# Patient Record
Sex: FEMALE | Race: WHITE | Employment: FULL TIME | URBAN - METROPOLITAN AREA
[De-identification: names, ages, dates, MRNs, and addresses within clinical notes are randomized per-mention and may not be internally consistent; named-entity substitution may affect disease eponyms.]

---

## 2021-10-05 ENCOUNTER — TELEPHONE (OUTPATIENT)
Dept: ORTHOPEDICS | Facility: CLINIC | Age: 56
End: 2021-10-05

## 2021-10-05 NOTE — TELEPHONE ENCOUNTER
[facility] Call Center    Phone Message    May a detailed message be left on voicemail: yes     Reason for Call: Other: Pt is scheduled for 10/15 needs a sooner appt. Please call to schedule     Action Taken: Other: [facility]    Travel Screening: Not Applicable

## 2021-10-15 ENCOUNTER — OFFICE VISIT (OUTPATIENT)
Dept: ORTHOPEDICS | Facility: CLINIC | Age: 56
End: 2021-10-15
Payer: COMMERCIAL

## 2021-10-15 VITALS — HEIGHT: 61 IN | BODY MASS INDEX: 47.58 KG/M2 | WEIGHT: 252 LBS

## 2021-10-15 DIAGNOSIS — E66.01 MORBID OBESITY (H): ICD-10-CM

## 2021-10-15 DIAGNOSIS — M79.89 SOFT TISSUE MASS: Primary | ICD-10-CM

## 2021-10-15 PROCEDURE — 99204 OFFICE O/P NEW MOD 45 MIN: CPT | Performed by: ORTHOPAEDIC SURGERY

## 2021-10-15 RX ORDER — LORAZEPAM 0.5 MG/1
0.5 TABLET ORAL
Status: ON HOLD | COMMUNITY
Start: 2021-10-04 | End: 2022-02-07

## 2021-10-15 ASSESSMENT — MIFFLIN-ST. JEOR: SCORE: 1667.05

## 2021-10-15 NOTE — NURSING NOTE
"Reason For Visit:   Chief Complaint   Patient presents with     Consult     consulting left upper arm mass referred by Dr. Rivera // first noticed Spring of 2021 // had biopsy done 10/6 at [facility]        Ht 1.56 m (5' 1.42\")   Wt 114.3 kg (252 lb)   BMI 46.97 kg/m      Pain Assessment  Patient Currently in Pain: Denies (no pain per pt)    Katarina Willingham ATC    "

## 2021-10-15 NOTE — PROGRESS NOTES
This 56-year-old woman has had a growing mass in the left arm that was recently biopsied.  The biopsy results have not been reported back to her.  Despite that she reports that she had a PET scan that showed no other lesions.  She still has some discomfort from the biopsy that radiated few inches distally from the lesion.    On examination she is alert oriented has a normal mood and affect and is in no acute distress although expressed some anxiety certain portions of her interview.  She did not tolerate her MRI very well because it was hot and felt closed in.  She is anxious about coming into clinic and is very concerned about having general anesthesia.  In her left distal upper extremity there is no edema or erythema.  The patient does have a large protuberant mass over the anterior lateral deltoid with some purplish coloration on the skin and eschar in the middle.  It has not been draining per the patient's report however.  The hand is neurovascularly intact and has normal motion and strength.    I reviewed the patient's MRI which shows this large mass involving the skin and subcutaneous tissue down to the surface of the deltoid muscle.  It does seem to involve some of the deltoid fibers.  The bone is not involved.  This certainly could be a soft tissue sarcoma.    We talked about the possibility that this is a soft tissue sarcoma given its size and the impact is having on the skin.  If this is the case would most likely recommend radiation and then excision.  Lesion such as a rhabdomyosarcoma may be better served with chemotherapy upfront.  We will pursue the pathology slides so that we can confirm any diagnosis that surrounded by the outside institution.  I talked to the patient about all these treatments and the potential risks and benefits.  She is aware that she would lose patch of skin if this were a malignancy which would necessitate some soft tissue advancement and possibly a split-thickness skin graft.   Soft tissue sarcomas have the risk of lung metastases and occasionally lymph node metastases.  We will work on arranging radiation in your home if the presumed diagnosis of soft tissue sarcoma is confirmed.  Have answered all of her questions today.

## 2021-10-15 NOTE — NURSING NOTE
"RN spoke with Abbe and her spouse.  She states that she has \"white coat  Syndrome\"  Her CT and MRI and Biopsy and Mammograms and PET where all done at [facility] in [place].  We will send for pathology and have our staff here review it.  We will then call her and start Radiation therapy close to home.  She was instructed to call me when she was close to the end of her Radiation therapy.  We reviewed plan going forward and will certainly review again.  Imaging of CT not MRI will be done for restaging after Radiation and done locally and patient may have virutal to discuss surgery.    "

## 2021-10-15 NOTE — LETTER
10/15/2021    RE: Abbe Lezama  [address] [address]  [place] [address]    Dear Colleague,    Thank you for referring your patient, Abbe Lezama, to the [facility] [place]. Please see a copy of my visit note below.    This 56-year-old woman has had a growing mass in the left arm that was recently biopsied.  The biopsy results have not been reported back to her.  Despite that she reports that she had a PET scan that showed no other lesions.  She still has some discomfort from the biopsy that radiated few inches distally from the lesion.    On examination she is alert oriented has a normal mood and affect and is in no acute distress although expressed some anxiety certain portions of her interview.  She did not tolerate her MRI very well because it was hot and felt closed in.  She is anxious about coming into clinic and is very concerned about having general anesthesia.  In her left distal upper extremity there is no edema or erythema.  The patient does have a large protuberant mass over the anterior lateral deltoid with some purplish coloration on the skin and eschar in the middle.  It has not been draining per the patient's report however.  The hand is neurovascularly intact and has normal motion and strength.    I reviewed the patient's MRI which shows this large mass involving the skin and subcutaneous tissue down to the surface of the deltoid muscle.  It does seem to involve some of the deltoid fibers.  The bone is not involved.  This certainly could be a soft tissue sarcoma.    We talked about the possibility that this is a soft tissue sarcoma given its size and the impact is having on the skin.  If this is the case would most likely recommend radiation and then excision.  Lesion such as a rhabdomyosarcoma may be better served with chemotherapy upfront.  We will pursue the pathology slides so that we can confirm any diagnosis that surrounded by the outside institution.  I  talked to the patient about all these treatments and the potential risks and benefits.  She is aware that she would lose patch of skin if this were a malignancy which would necessitate some soft tissue advancement and possibly a split-thickness skin graft.  Soft tissue sarcomas have the risk of lung metastases and occasionally lymph node metastases.  We will work on arranging radiation in your home if the presumed diagnosis of soft tissue sarcoma is confirmed.  Have answered all of her questions today.      Gamal Zhang MD

## 2021-10-18 ENCOUNTER — TELEPHONE (OUTPATIENT)
Dept: ORTHOPEDICS | Facility: CLINIC | Age: 56
End: 2021-10-18

## 2021-10-20 ENCOUNTER — TELEPHONE (OUTPATIENT)
Dept: ORTHOPEDICS | Facility: CLINIC | Age: 56
End: 2021-10-20

## 2021-10-20 NOTE — TELEPHONE ENCOUNTER
RN called over toPathology at:    [facility]    Address: [address], [place] [address]    Phone: [phone]    Rn left a voice message in Pathology asking if they received our request for slides faxed on  10-18-21.    Fax: [phone]      Please call me back if yes or no.  Thanks  Yocasta

## 2021-10-20 NOTE — TELEPHONE ENCOUNTER
RN called and spoke with Abbe.  She was informed to go thru her Oncologist for the Radiation referral locally.  They would know who does Radiation down there more than I do.  She will ask her Oncologist.  RN stressed to her that we needed to confirm diagnosis with our Pathology department before she is seen.  She should not make an appt for this week.  RN will call when we have reviewed pathology.

## 2021-10-20 NOTE — TELEPHONE ENCOUNTER
RN called down to:      [facility]. [facility] is part of the [facility].   COVID-19 info: [email or web address]  Address: [address] [place] [id number]  Phone: [phone]    They do not have a Radiation department.

## 2021-10-20 NOTE — TELEPHONE ENCOUNTER
[facility]    Phone Message    May a detailed message be left on voicemail: yes     Reason for Call: Requesting Results   Name/type of test: Biopsy   Date of test: 10/6  Was test done at a location other than [facility]?: No      Action Taken: Message routed to:  [facility]o    Travel Screening: Not Applicable     Patient took Biopsy @ [facility] and says they have been trying to PUSH results through PACS but aren't having any luck , pt's  Says they may have to fax results ( fax # was provided incase PACS wont work )     Patient needs a call back as soon as we have results so she can hopefully start radiation next week -- she would like us to assist in getting results to us

## 2021-10-20 NOTE — TELEPHONE ENCOUNTER
RN called over to Pathology here at the  and spoke with Everette.  He confirmed that we have not yet received pathology slides from [facility] yet.

## 2021-10-20 NOTE — TELEPHONE ENCOUNTER
RN returned call to Abbe.  The confusion is that we are requesting pathology slides to be sent to us for review by our pathology department.  This is not done thru PACS. On ly imaging is done this route.  RN has requested imaging from [facility] to be sent to our Pathology department.  She is asking to get scheduled for Radiation while waiting for confirmation of this.  She wants to have the Radiation done in [place]. Not here at the .  She does not know where Radiation or who does it down there.  RN will call over to [facility] and ask.

## 2021-10-21 ENCOUNTER — TELEPHONE (OUTPATIENT)
Dept: ORTHOPEDICS | Facility: CLINIC | Age: 56
End: 2021-10-21

## 2021-10-21 NOTE — TELEPHONE ENCOUNTER
[facility] Call Center    Phone Message    May a detailed message be left on voicemail: yes     Reason for Call: Other: Patient had requested that Octavia call on her behalf to see if the slides have been received so that a referral could be sent to [facility].  let Octavia know that a call was put to [facility] yesterday and we are still waiting on response. Octavia has offered her assistance with this patient if there is anything she can do to help Please call her at [phone] if needed.      Action Taken: Message routed to:  [facility]o    Travel Screening: Not Applicable

## 2021-10-22 ENCOUNTER — TELEPHONE (OUTPATIENT)
Dept: ORTHOPEDICS | Facility: CLINIC | Age: 56
End: 2021-10-22

## 2021-10-22 ENCOUNTER — LAB (OUTPATIENT)
Dept: LAB | Facility: CLINIC | Age: 56
End: 2021-10-22
Payer: COMMERCIAL

## 2021-10-22 PROCEDURE — 88342 IMHCHEM/IMCYTCHM 1ST ANTB: CPT | Mod: TC | Performed by: ORTHOPAEDIC SURGERY

## 2021-10-22 NOTE — TELEPHONE ENCOUNTER
RN calling to see if Pathology has been sent to us for review.    [facility]   [address], [place] [address]  Hours:   Open 24 hours     More hours  Phone: [phone]

## 2021-10-25 NOTE — TELEPHONE ENCOUNTER
Rn reached back out to Octavia, who is a navigator at the [facility]..  [place] patient.  RN informed her that the slides did get to us and they are currently in process and final diagnosis is not final.  When it has been finalized she will need Radiation.  Octavia says that she will go to [facility], [facility] of [place]  Providers:  Girish Morgan and Beulah Barraza, Radiation  Fax:[phone]  Phone [phone]    RN provided Octavia her direct number and she offered to push imaging to [place] if needed.

## 2021-10-28 LAB
PATH REPORT.COMMENTS IMP SPEC: ABNORMAL
PATH REPORT.COMMENTS IMP SPEC: YES
PATH REPORT.FINAL DX SPEC: ABNORMAL
PATH REPORT.GROSS SPEC: ABNORMAL
PATH REPORT.RELEVANT HX SPEC: ABNORMAL
PATH REPORT.RELEVANT HX SPEC: ABNORMAL
PATH REPORT.SITE OF ORIGIN SPEC: ABNORMAL

## 2021-10-28 PROCEDURE — 88325 CONSLTJ COMPRE RVW REC REPRT: CPT | Performed by: PATHOLOGY

## 2021-11-03 DIAGNOSIS — C49.9 SARCOMA (H): Primary | ICD-10-CM

## 2021-11-03 NOTE — TELEPHONE ENCOUNTER
[facility] Call Center    Phone Message    May a detailed message be left on voicemail: yes     Reason for Call: Other: Octavia is calling to get an update from Yocasta      Action Taken: Message routed to:  [facility]o    Travel Screening: Not Applicable     They would like to know if the Pathology report has been Re read and if we have a final diagnosis so that the patient can begin treatments in [place] -- Patient Is very anxious and would like to get an update as soon as possible       Octavia would like a call back from Yocasta [facility] to confirm this so she can get into connection / [place] to get the ball rolling

## 2021-11-04 ENCOUNTER — TELEPHONE (OUTPATIENT)
Dept: ORTHOPEDICS | Facility: CLINIC | Age: 56
End: 2021-11-04

## 2021-11-04 NOTE — TELEPHONE ENCOUNTER
RN called and left a voice message for Octavia.  RN faxed over referral and called and verified they have them.

## 2021-11-29 ENCOUNTER — TRANSFERRED RECORDS (OUTPATIENT)
Dept: HEALTH INFORMATION MANAGEMENT | Facility: CLINIC | Age: 56
End: 2021-11-29
Payer: COMMERCIAL

## 2021-12-17 ENCOUNTER — TELEPHONE (OUTPATIENT)
Dept: ORTHOPEDICS | Facility: CLINIC | Age: 56
End: 2021-12-17
Payer: COMMERCIAL

## 2021-12-17 NOTE — TELEPHONE ENCOUNTER
[facility] Call [facility]    Phone Message    May a detailed message be left on voicemail: yes     Reason for Call: Other: Patient is calling to speak with Yocasta, patient did not state reason. Please call patient back.  left 239p     Action Taken: Message routed to:  [facility] Ortho    Travel Screening: Not Applicable

## 2021-12-21 ENCOUNTER — TELEPHONE (OUTPATIENT)
Dept: ORTHOPEDICS | Facility: CLINIC | Age: 56
End: 2021-12-21
Payer: COMMERCIAL

## 2021-12-21 NOTE — TELEPHONE ENCOUNTER
RN called and spoke with Abbe.  Her last day of Radiation is 12-28-21.  RN will call her back with the plan of care going forward.

## 2022-01-10 ENCOUNTER — TELEPHONE (OUTPATIENT)
Dept: ORTHOPEDICS | Facility: CLINIC | Age: 57
End: 2022-01-10
Payer: COMMERCIAL

## 2022-01-10 DIAGNOSIS — C49.9 SARCOMA (H): Primary | ICD-10-CM

## 2022-01-10 NOTE — TELEPHONE ENCOUNTER
[facility]    Phone Message    May a detailed message be left on voicemail: yes     Reason for Call: Other: Per Octavia, patient was thinking that she also needed a CT of her Left Arm. Please fax additional CT order if needed to [phone]     Action Taken: Message routed to:  [facility] Ortho    Travel Screening: Not Applicable

## 2022-01-12 DIAGNOSIS — M79.89 SOFT TISSUE MASS: Primary | ICD-10-CM

## 2022-01-12 DIAGNOSIS — C49.9 SARCOMA (H): ICD-10-CM

## 2022-01-21 ENCOUNTER — TELEPHONE (OUTPATIENT)
Dept: ORTHOPEDICS | Facility: CLINIC | Age: 57
End: 2022-01-21
Payer: COMMERCIAL

## 2022-01-21 NOTE — TELEPHONE ENCOUNTER
RN called and spoke with Abbe regarding imaging and follow up appt with Dr. Zhang and questions about surgery and recovery

## 2022-01-24 ENCOUNTER — TELEPHONE (OUTPATIENT)
Dept: ORTHOPEDICS | Facility: CLINIC | Age: 57
End: 2022-01-24
Payer: COMMERCIAL

## 2022-01-24 NOTE — TELEPHONE ENCOUNTER
RN returned call to Abbe.  RN has the reports and imaging has been requested.  RN informed Gwendolyn that Abbe is aware that he will not look at imaging before her appt on Wednesday with us as I told her this last week.     She states that the patient is very anxious.      Fax:[phone].

## 2022-01-24 NOTE — TELEPHONE ENCOUNTER
[facility] Call [facility]    Phone Message    May a detailed message be left on voicemail: yes     Reason for Call: Other: Octavia Ruelas, Oncology Nurse Navigator at [facility], calling to speak with Yocasta. Please call back to [phone] to discuss patientAbbe.      Action Taken: Message routed to:  [facility] Ortho    Travel Screening: Not Applicable

## 2022-01-26 ENCOUNTER — VIRTUAL VISIT (OUTPATIENT)
Dept: ORTHOPEDICS | Facility: CLINIC | Age: 57
End: 2022-01-26
Payer: COMMERCIAL

## 2022-01-26 DIAGNOSIS — C49.9 SARCOMA (H): Primary | ICD-10-CM

## 2022-01-26 PROCEDURE — 99213 OFFICE O/P EST LOW 20 MIN: CPT | Mod: TEL | Performed by: ORTHOPAEDIC SURGERY

## 2022-01-26 NOTE — PROGRESS NOTES
Abbe is a 56 year old who is being evaluated via a billable telephone visit.      What phone number would you like to be contacted at? [phone]  How would you like to obtain your AVS? Adonis    Phone call duration: 26 minutes    I had an extensive talk with this patient about her tumor and upcoming surgery.  She is anxious about surgery.  She has been managing open wound over the tumor that has begun to drain a little more.  She finished radiation about a month ago.  She has noticed some fullness over the left clavicle over the last couple weeks.    I reviewed her imaging studies and the reports.  She has a subcutaneous mass superficial to the left clavicle which is new.  Her lung fields do not show any obvious tumor.    Over the phone she was alert and oriented.  She has made good questions.  She expressed anxiety about the upcoming surgery.    We discussed that she would have wide resection and an expected split-thickness skin graft from the left thigh to the left shoulder area.  She will need to have her initial dressing change 4 days postoperatively after which she will have an Adaptic dressing for the next 10 days until her staples are removed.  For the first 4 days after surgery she will be in a sling with no shoulder motion but after that we will allow her to advance her activity as tolerated.  We will also remove this subcutaneous mass over the clavicle.  I have answered all her questions today.  Surgery is planned for 12 days from now.

## 2022-01-26 NOTE — NURSING NOTE
Reason For Visit:   Chief Complaint   Patient presents with     RECHECK     review imaging // discuss treatment plan        There were no vitals taken for this visit.    Pain Assessment  Patient Currently in Pain: Denies (no pain per pt)    Katarina Willingham ATC

## 2022-01-26 NOTE — LETTER
1/26/2022         RE: Abbe Lezama  [address] [address]  [place] [address]        Dear Colleague,    Thank you for referring your patient, Abbe Lezama, to the [facility] [place]. Please see a copy of my visit note below.    Abbe is a 56 year old who is being evaluated via a billable telephone visit.      What phone number would you like to be contacted at? [phone]  How would you like to obtain your AVS? Daisyhart    Phone call duration: 26 minutes    I had an extensive talk with this patient about her tumor and upcoming surgery.  She is anxious about surgery.  She has been managing open wound over the tumor that has begun to drain a little more.  She finished radiation about a month ago.  She has noticed some fullness over the left clavicle over the last couple weeks.    I reviewed her imaging studies and the reports.  She has a subcutaneous mass superficial to the left clavicle which is new.  Her lung fields do not show any obvious tumor.    Over the phone she was alert and oriented.  She has made good questions.  She expressed anxiety about the upcoming surgery.    We discussed that she would have wide resection and an expected split-thickness skin graft from the left thigh to the left shoulder area.  She will need to have her initial dressing change 4 days postoperatively after which she will have an Adaptic dressing for the next 10 days until her staples are removed.  For the first 4 days after surgery she will be in a sling with no shoulder motion but after that we will allow her to advance her activity as tolerated.  We will also remove this subcutaneous mass over the clavicle.  I have answered all her questions today.  Surgery is planned for 12 days from now.        Again, thank you for allowing me to participate in the care of your patient.        Sincerely,        Gamal Zhang MD

## 2022-01-31 ENCOUNTER — TELEPHONE (OUTPATIENT)
Dept: ORTHOPEDICS | Facility: CLINIC | Age: 57
End: 2022-01-31
Payer: COMMERCIAL

## 2022-01-31 NOTE — TELEPHONE ENCOUNTER
RN returned call to Abbe.  RN spoke with her and answered all of the questions that she has.  RN also encouraged some relaxation/ anxiety reducing techniques

## 2022-02-01 ENCOUNTER — TELEPHONE (OUTPATIENT)
Dept: ORTHOPEDICS | Facility: CLINIC | Age: 57
End: 2022-02-01
Payer: COMMERCIAL

## 2022-02-01 DIAGNOSIS — Z11.59 ENCOUNTER FOR SCREENING FOR OTHER VIRAL DISEASES: Primary | ICD-10-CM

## 2022-02-01 NOTE — TELEPHONE ENCOUNTER
Patient is scheduled for surgery with Dr. Zhang    Spoke with: GINGER Rust - scheduled with patient    Date of Surgery: 2/7/2022    Location: [place]    Informed patient they will need an adult  yes    Pre op with Provider n/a    H&P: Scheduled with pcp    Pre-procedure COVID-19 Test: patient will do that locally    Additional imaging/appointments: n/a    Surgery packet: Given in clinic     Additional comments: n/a

## 2022-02-02 ENCOUNTER — TRANSFERRED RECORDS (OUTPATIENT)
Dept: MULTI SPECIALTY CLINIC | Facility: CLINIC | Age: 57
End: 2022-02-02
Payer: COMMERCIAL

## 2022-02-02 LAB
ALT SERPL-CCNC: 13 IU/L (ref 8–45)
AST SERPL-CCNC: 13 IU/L (ref 2–40)
CREATININE (EXTERNAL): 0.73 MG/DL (ref 0.57–1.11)
GFR ESTIMATED (EXTERNAL): >60 ML/MIN/1.73M2
GFR ESTIMATED (IF AFRICAN AMERICAN) (EXTERNAL): >60 ML/MIN/1.73M2
GLUCOSE (EXTERNAL): 89 MG/DL (ref 65–100)
POTASSIUM (EXTERNAL): 3.8 MMOL/L (ref 3.5–5)

## 2022-02-02 RX ORDER — TRAMADOL HYDROCHLORIDE 50 MG/1
TABLET ORAL
Status: ON HOLD | COMMUNITY
Start: 2021-12-21 | End: 2022-02-07

## 2022-02-02 RX ORDER — SILVER SULFADIAZINE 10 MG/G
CREAM TOPICAL
Status: ON HOLD | COMMUNITY
Start: 2021-12-06 | End: 2022-02-07

## 2022-02-02 RX ORDER — ASCORBIC ACID 500 MG
500 TABLET ORAL
Status: ON HOLD | COMMUNITY
End: 2022-02-07

## 2022-02-02 RX ORDER — ACETAMINOPHEN AND CODEINE PHOSPHATE 300; 30 MG/1; MG/1
1 TABLET ORAL
Status: ON HOLD | COMMUNITY
Start: 2021-12-22 | End: 2022-02-07

## 2022-02-03 ENCOUNTER — TELEPHONE (OUTPATIENT)
Dept: ORTHOPEDICS | Facility: CLINIC | Age: 57
End: 2022-02-03
Payer: COMMERCIAL

## 2022-02-03 NOTE — TELEPHONE ENCOUNTER
[facility] Call Center    Phone Message    May a detailed message be left on voicemail: yes     Reason for Call Octavia stated Patient having Surgery with Doctor Leatha. Patient wants There Clinic ( [facility] ) to change Her Dressing after Surgery. Octavia stated No . They won't be doing that for Patient. Action Taken: Message routed to:  [facility]    Travel Screening: Not Applicable

## 2022-02-03 NOTE — TELEPHONE ENCOUNTER
Spoke with Octavia the nurse coordinator.  The local surgeons are not able to do the dressing changes.  She did give me information on the [facility] wound care clinic - [phone] and the [facility] [phone].

## 2022-02-06 ENCOUNTER — HEALTH MAINTENANCE LETTER (OUTPATIENT)
Age: 57
End: 2022-02-06

## 2022-02-07 ENCOUNTER — TELEPHONE (OUTPATIENT)
Dept: ORTHOPEDICS | Facility: CLINIC | Age: 57
End: 2022-02-07

## 2022-02-07 ENCOUNTER — HOSPITAL ENCOUNTER (OUTPATIENT)
Facility: CLINIC | Age: 57
Discharge: HOME | End: 2022-02-07
Attending: ORTHOPAEDIC SURGERY | Admitting: ORTHOPAEDIC SURGERY
Payer: COMMERCIAL

## 2022-02-07 ENCOUNTER — ANESTHESIA (OUTPATIENT)
Dept: SURGERY | Facility: CLINIC | Age: 57
End: 2022-02-07
Payer: COMMERCIAL

## 2022-02-07 ENCOUNTER — ANESTHESIA EVENT (OUTPATIENT)
Dept: SURGERY | Facility: CLINIC | Age: 57
End: 2022-02-07
Payer: COMMERCIAL

## 2022-02-07 VITALS
OXYGEN SATURATION: 97 % | WEIGHT: 222.66 LBS | HEART RATE: 104 BPM | SYSTOLIC BLOOD PRESSURE: 129 MMHG | HEIGHT: 61 IN | BODY MASS INDEX: 42.04 KG/M2 | DIASTOLIC BLOOD PRESSURE: 80 MMHG | RESPIRATION RATE: 20 BRPM | TEMPERATURE: 97.7 F

## 2022-02-07 DIAGNOSIS — C49.9 SARCOMA (H): Primary | ICD-10-CM

## 2022-02-07 LAB — GLUCOSE BLDC GLUCOMTR-MCNC: 75 MG/DL (ref 70–99)

## 2022-02-07 PROCEDURE — 710N000012 HC RECOVERY PHASE 2, PER MINUTE: Performed by: ORTHOPAEDIC SURGERY

## 2022-02-07 PROCEDURE — 88309 TISSUE EXAM BY PATHOLOGIST: CPT | Mod: TC | Performed by: ORTHOPAEDIC SURGERY

## 2022-02-07 PROCEDURE — 370N000017 HC ANESTHESIA TECHNICAL FEE, PER MIN: Performed by: ORTHOPAEDIC SURGERY

## 2022-02-07 PROCEDURE — 250N000013 HC RX MED GY IP 250 OP 250 PS 637: Performed by: STUDENT IN AN ORGANIZED HEALTH CARE EDUCATION/TRAINING PROGRAM

## 2022-02-07 PROCEDURE — 250N000011 HC RX IP 250 OP 636: Performed by: ANESTHESIOLOGY

## 2022-02-07 PROCEDURE — 250N000025 HC SEVOFLURANE, PER MIN: Performed by: ORTHOPAEDIC SURGERY

## 2022-02-07 PROCEDURE — C9290 INJ, BUPIVACAINE LIPOSOME: HCPCS | Performed by: ANESTHESIOLOGY

## 2022-02-07 PROCEDURE — 250N000009 HC RX 250: Performed by: ANESTHESIOLOGY

## 2022-02-07 PROCEDURE — 710N000010 HC RECOVERY PHASE 1, LEVEL 2, PER MIN: Performed by: ORTHOPAEDIC SURGERY

## 2022-02-07 PROCEDURE — 360N000077 HC SURGERY LEVEL 4, PER MIN: Performed by: ORTHOPAEDIC SURGERY

## 2022-02-07 PROCEDURE — 82962 GLUCOSE BLOOD TEST: CPT

## 2022-02-07 PROCEDURE — 250N000009 HC RX 250: Performed by: ORTHOPAEDIC SURGERY

## 2022-02-07 PROCEDURE — 999N000141 HC STATISTIC PRE-PROCEDURE NURSING ASSESSMENT: Performed by: ORTHOPAEDIC SURGERY

## 2022-02-07 PROCEDURE — 23078 RAD RESCJ TUM SHOULDER 5CM/>: CPT | Mod: LT | Performed by: ORTHOPAEDIC SURGERY

## 2022-02-07 PROCEDURE — 23077 RAD RESCJ TUM SHOULDER<5 CM: CPT | Mod: 59 | Performed by: ORTHOPAEDIC SURGERY

## 2022-02-07 PROCEDURE — 250N000011 HC RX IP 250 OP 636: Performed by: PHYSICIAN ASSISTANT

## 2022-02-07 PROCEDURE — 272N000001 HC OR GENERAL SUPPLY STERILE: Performed by: ORTHOPAEDIC SURGERY

## 2022-02-07 PROCEDURE — 88307 TISSUE EXAM BY PATHOLOGIST: CPT | Mod: TC | Performed by: ORTHOPAEDIC SURGERY

## 2022-02-07 PROCEDURE — 258N000003 HC RX IP 258 OP 636: Performed by: ANESTHESIOLOGY

## 2022-02-07 RX ORDER — ONDANSETRON 2 MG/ML
INJECTION INTRAMUSCULAR; INTRAVENOUS PRN
Status: DISCONTINUED | OUTPATIENT
Start: 2022-02-07 | End: 2022-02-07

## 2022-02-07 RX ORDER — FENTANYL CITRATE 50 UG/ML
25-50 INJECTION, SOLUTION INTRAMUSCULAR; INTRAVENOUS
Status: DISCONTINUED | OUTPATIENT
Start: 2022-02-07 | End: 2022-02-07 | Stop reason: HOSPADM

## 2022-02-07 RX ORDER — METHOCARBAMOL 500 MG/1
500 TABLET, FILM COATED ORAL 4 TIMES DAILY PRN
Qty: 20 TABLET | Refills: 0 | Status: SHIPPED | OUTPATIENT
Start: 2022-02-07

## 2022-02-07 RX ORDER — HYDROMORPHONE HYDROCHLORIDE 1 MG/ML
0.2 INJECTION, SOLUTION INTRAMUSCULAR; INTRAVENOUS; SUBCUTANEOUS EVERY 5 MIN PRN
Status: DISCONTINUED | OUTPATIENT
Start: 2022-02-07 | End: 2022-02-07 | Stop reason: HOSPADM

## 2022-02-07 RX ORDER — FENTANYL CITRATE 50 UG/ML
25 INJECTION, SOLUTION INTRAMUSCULAR; INTRAVENOUS EVERY 5 MIN PRN
Status: DISCONTINUED | OUTPATIENT
Start: 2022-02-07 | End: 2022-02-07 | Stop reason: HOSPADM

## 2022-02-07 RX ORDER — CEFAZOLIN SODIUM 2 G/100ML
2 INJECTION, SOLUTION INTRAVENOUS SEE ADMIN INSTRUCTIONS
Status: DISCONTINUED | OUTPATIENT
Start: 2022-02-07 | End: 2022-02-07 | Stop reason: HOSPADM

## 2022-02-07 RX ORDER — ACETAMINOPHEN 325 MG/1
650 TABLET ORAL EVERY 4 HOURS PRN
Qty: 100 TABLET | Refills: 0 | Status: SHIPPED | OUTPATIENT
Start: 2022-02-07

## 2022-02-07 RX ORDER — BUPIVACAINE HYDROCHLORIDE AND EPINEPHRINE 2.5; 5 MG/ML; UG/ML
INJECTION, SOLUTION INFILTRATION; PERINEURAL PRN
Status: DISCONTINUED | OUTPATIENT
Start: 2022-02-07 | End: 2022-02-07 | Stop reason: HOSPADM

## 2022-02-07 RX ORDER — MINERAL OIL
OIL (ML) MISCELLANEOUS PRN
Status: DISCONTINUED | OUTPATIENT
Start: 2022-02-07 | End: 2022-02-07 | Stop reason: HOSPADM

## 2022-02-07 RX ORDER — KETAMINE HYDROCHLORIDE 10 MG/ML
INJECTION INTRAMUSCULAR; INTRAVENOUS PRN
Status: DISCONTINUED | OUTPATIENT
Start: 2022-02-07 | End: 2022-02-07

## 2022-02-07 RX ORDER — FENTANYL CITRATE 50 UG/ML
25 INJECTION, SOLUTION INTRAMUSCULAR; INTRAVENOUS
Status: DISCONTINUED | OUTPATIENT
Start: 2022-02-07 | End: 2022-02-07 | Stop reason: HOSPADM

## 2022-02-07 RX ORDER — ONDANSETRON 4 MG/1
4 TABLET, ORALLY DISINTEGRATING ORAL
Status: DISCONTINUED | OUTPATIENT
Start: 2022-02-07 | End: 2022-02-07 | Stop reason: HOSPADM

## 2022-02-07 RX ORDER — ACETAMINOPHEN 325 MG/1
650 TABLET ORAL
Status: DISCONTINUED | OUTPATIENT
Start: 2022-02-07 | End: 2022-02-07 | Stop reason: HOSPADM

## 2022-02-07 RX ORDER — EPHEDRINE SULFATE 50 MG/ML
INJECTION, SOLUTION INTRAVENOUS PRN
Status: DISCONTINUED | OUTPATIENT
Start: 2022-02-07 | End: 2022-02-07

## 2022-02-07 RX ORDER — OXYCODONE HYDROCHLORIDE 5 MG/1
5 TABLET ORAL
Status: COMPLETED | OUTPATIENT
Start: 2022-02-07 | End: 2022-02-07

## 2022-02-07 RX ORDER — OXYCODONE HYDROCHLORIDE 5 MG/1
5 TABLET ORAL EVERY 4 HOURS PRN
Status: DISCONTINUED | OUTPATIENT
Start: 2022-02-07 | End: 2022-02-07 | Stop reason: HOSPADM

## 2022-02-07 RX ORDER — NALOXONE HYDROCHLORIDE 0.4 MG/ML
0.2 INJECTION, SOLUTION INTRAMUSCULAR; INTRAVENOUS; SUBCUTANEOUS
Status: DISCONTINUED | OUTPATIENT
Start: 2022-02-07 | End: 2022-02-07 | Stop reason: HOSPADM

## 2022-02-07 RX ORDER — ONDANSETRON 4 MG/1
4 TABLET, ORALLY DISINTEGRATING ORAL EVERY 30 MIN PRN
Status: DISCONTINUED | OUTPATIENT
Start: 2022-02-07 | End: 2022-02-07 | Stop reason: HOSPADM

## 2022-02-07 RX ORDER — ONDANSETRON 2 MG/ML
4 INJECTION INTRAMUSCULAR; INTRAVENOUS EVERY 30 MIN PRN
Status: DISCONTINUED | OUTPATIENT
Start: 2022-02-07 | End: 2022-02-07 | Stop reason: HOSPADM

## 2022-02-07 RX ORDER — BUPIVACAINE HYDROCHLORIDE AND EPINEPHRINE 5; 5 MG/ML; UG/ML
INJECTION, SOLUTION PERINEURAL PRN
Status: DISCONTINUED | OUTPATIENT
Start: 2022-02-07 | End: 2022-02-07

## 2022-02-07 RX ORDER — HYDROXYZINE HYDROCHLORIDE 25 MG/1
25 TABLET, FILM COATED ORAL
Status: DISCONTINUED | OUTPATIENT
Start: 2022-02-07 | End: 2022-02-07 | Stop reason: HOSPADM

## 2022-02-07 RX ORDER — PROPOFOL 10 MG/ML
INJECTION, EMULSION INTRAVENOUS PRN
Status: DISCONTINUED | OUTPATIENT
Start: 2022-02-07 | End: 2022-02-07

## 2022-02-07 RX ORDER — PROPOFOL 10 MG/ML
INJECTION, EMULSION INTRAVENOUS CONTINUOUS PRN
Status: DISCONTINUED | OUTPATIENT
Start: 2022-02-07 | End: 2022-02-07

## 2022-02-07 RX ORDER — OXYCODONE HYDROCHLORIDE 5 MG/1
5-10 TABLET ORAL EVERY 4 HOURS PRN
Qty: 26 TABLET | Refills: 0 | Status: SHIPPED | OUTPATIENT
Start: 2022-02-07

## 2022-02-07 RX ORDER — SODIUM CHLORIDE, SODIUM LACTATE, POTASSIUM CHLORIDE, CALCIUM CHLORIDE 600; 310; 30; 20 MG/100ML; MG/100ML; MG/100ML; MG/100ML
INJECTION, SOLUTION INTRAVENOUS CONTINUOUS
Status: DISCONTINUED | OUTPATIENT
Start: 2022-02-07 | End: 2022-02-07 | Stop reason: HOSPADM

## 2022-02-07 RX ORDER — ONDANSETRON 4 MG/1
4-8 TABLET, ORALLY DISINTEGRATING ORAL EVERY 8 HOURS PRN
Qty: 4 TABLET | Refills: 0 | Status: SHIPPED | OUTPATIENT
Start: 2022-02-07

## 2022-02-07 RX ORDER — NALOXONE HYDROCHLORIDE 0.4 MG/ML
0.4 INJECTION, SOLUTION INTRAMUSCULAR; INTRAVENOUS; SUBCUTANEOUS
Status: DISCONTINUED | OUTPATIENT
Start: 2022-02-07 | End: 2022-02-07 | Stop reason: HOSPADM

## 2022-02-07 RX ORDER — FLUMAZENIL 0.1 MG/ML
0.2 INJECTION, SOLUTION INTRAVENOUS
Status: DISCONTINUED | OUTPATIENT
Start: 2022-02-07 | End: 2022-02-07 | Stop reason: HOSPADM

## 2022-02-07 RX ORDER — MEPERIDINE HYDROCHLORIDE 25 MG/ML
12.5 INJECTION INTRAMUSCULAR; INTRAVENOUS; SUBCUTANEOUS
Status: DISCONTINUED | OUTPATIENT
Start: 2022-02-07 | End: 2022-02-07 | Stop reason: HOSPADM

## 2022-02-07 RX ORDER — HYDROXYZINE HYDROCHLORIDE 25 MG/1
25-50 TABLET, FILM COATED ORAL EVERY 6 HOURS PRN
Qty: 40 TABLET | Refills: 0 | Status: SHIPPED | OUTPATIENT
Start: 2022-02-07

## 2022-02-07 RX ORDER — AMOXICILLIN 250 MG
1-2 CAPSULE ORAL 2 TIMES DAILY
Qty: 30 TABLET | Refills: 0 | Status: SHIPPED | OUTPATIENT
Start: 2022-02-07

## 2022-02-07 RX ORDER — CEFAZOLIN SODIUM 2 G/100ML
2 INJECTION, SOLUTION INTRAVENOUS
Status: COMPLETED | OUTPATIENT
Start: 2022-02-07 | End: 2022-02-07

## 2022-02-07 RX ORDER — FENTANYL CITRATE 50 UG/ML
INJECTION, SOLUTION INTRAMUSCULAR; INTRAVENOUS PRN
Status: DISCONTINUED | OUTPATIENT
Start: 2022-02-07 | End: 2022-02-07

## 2022-02-07 RX ORDER — METHOCARBAMOL 750 MG/1
750 TABLET, FILM COATED ORAL
Status: DISCONTINUED | OUTPATIENT
Start: 2022-02-07 | End: 2022-02-07 | Stop reason: HOSPADM

## 2022-02-07 RX ADMIN — CEFAZOLIN 2 G: 10 INJECTION, POWDER, FOR SOLUTION INTRAVENOUS at 12:08

## 2022-02-07 RX ADMIN — Medication 10 MG: at 12:48

## 2022-02-07 RX ADMIN — PHENYLEPHRINE HYDROCHLORIDE 0.4 MCG/KG/MIN: 10 INJECTION INTRAVENOUS at 14:19

## 2022-02-07 RX ADMIN — ONDANSETRON 4 MG: 2 INJECTION INTRAMUSCULAR; INTRAVENOUS at 12:23

## 2022-02-07 RX ADMIN — PROPOFOL 110 MG: 10 INJECTION, EMULSION INTRAVENOUS at 13:09

## 2022-02-07 RX ADMIN — BUPIVACAINE 10 ML: 13.3 INJECTION, SUSPENSION, LIPOSOMAL INFILTRATION at 11:17

## 2022-02-07 RX ADMIN — PHENYLEPHRINE HYDROCHLORIDE 200 MCG: 10 INJECTION INTRAVENOUS at 13:44

## 2022-02-07 RX ADMIN — PHENYLEPHRINE HYDROCHLORIDE 200 MCG: 10 INJECTION INTRAVENOUS at 13:32

## 2022-02-07 RX ADMIN — MIDAZOLAM 1 MG: 1 INJECTION INTRAMUSCULAR; INTRAVENOUS at 12:11

## 2022-02-07 RX ADMIN — PROPOFOL 20 MG: 10 INJECTION, EMULSION INTRAVENOUS at 12:36

## 2022-02-07 RX ADMIN — Medication 8 MCG: at 12:58

## 2022-02-07 RX ADMIN — FENTANYL CITRATE 50 MCG: 50 INJECTION, SOLUTION INTRAMUSCULAR; INTRAVENOUS at 13:09

## 2022-02-07 RX ADMIN — FENTANYL CITRATE 25 MCG: 50 INJECTION, SOLUTION INTRAMUSCULAR; INTRAVENOUS at 12:58

## 2022-02-07 RX ADMIN — SODIUM CHLORIDE, POTASSIUM CHLORIDE, SODIUM LACTATE AND CALCIUM CHLORIDE: 600; 310; 30; 20 INJECTION, SOLUTION INTRAVENOUS at 15:02

## 2022-02-07 RX ADMIN — PROPOFOL 40 MCG/KG/MIN: 10 INJECTION, EMULSION INTRAVENOUS at 12:23

## 2022-02-07 RX ADMIN — PHENYLEPHRINE HYDROCHLORIDE 100 MCG: 10 INJECTION INTRAVENOUS at 14:25

## 2022-02-07 RX ADMIN — Medication 20 MG: at 14:03

## 2022-02-07 RX ADMIN — EPHEDRINE SULFATE 5 MG: 50 INJECTION, SOLUTION INTRAVENOUS at 13:19

## 2022-02-07 RX ADMIN — ACETAMINOPHEN 650 MG: 325 TABLET, FILM COATED ORAL at 15:48

## 2022-02-07 RX ADMIN — PROPOFOL 20 MG: 10 INJECTION, EMULSION INTRAVENOUS at 12:43

## 2022-02-07 RX ADMIN — SUGAMMADEX 200 MG: 100 INJECTION, SOLUTION INTRAVENOUS at 14:44

## 2022-02-07 RX ADMIN — EPHEDRINE SULFATE 5 MG: 50 INJECTION, SOLUTION INTRAVENOUS at 13:32

## 2022-02-07 RX ADMIN — PHENYLEPHRINE HYDROCHLORIDE 100 MCG: 10 INJECTION INTRAVENOUS at 13:16

## 2022-02-07 RX ADMIN — BUPIVACAINE HYDROCHLORIDE AND EPINEPHRINE BITARTRATE 10 ML: 5; .005 INJECTION, SOLUTION EPIDURAL; INTRACAUDAL; PERINEURAL at 11:17

## 2022-02-07 RX ADMIN — MIDAZOLAM 1 MG: 1 INJECTION INTRAMUSCULAR; INTRAVENOUS at 12:17

## 2022-02-07 RX ADMIN — FENTANYL CITRATE 50 MCG: 50 INJECTION INTRAMUSCULAR; INTRAVENOUS at 11:15

## 2022-02-07 RX ADMIN — PROPOFOL 50 MG: 10 INJECTION, EMULSION INTRAVENOUS at 12:23

## 2022-02-07 RX ADMIN — HYDROMORPHONE HYDROCHLORIDE 0.2 MG: 1 INJECTION, SOLUTION INTRAMUSCULAR; INTRAVENOUS; SUBCUTANEOUS at 15:35

## 2022-02-07 RX ADMIN — OXYCODONE HYDROCHLORIDE 5 MG: 5 TABLET ORAL at 15:49

## 2022-02-07 RX ADMIN — ROCURONIUM BROMIDE 50 MG: 50 INJECTION, SOLUTION INTRAVENOUS at 13:09

## 2022-02-07 RX ADMIN — PHENYLEPHRINE HYDROCHLORIDE 0.3 MCG/KG/MIN: 10 INJECTION INTRAVENOUS at 13:45

## 2022-02-07 RX ADMIN — PHENYLEPHRINE HYDROCHLORIDE 100 MCG: 10 INJECTION INTRAVENOUS at 13:19

## 2022-02-07 RX ADMIN — Medication 12 MCG: at 12:55

## 2022-02-07 RX ADMIN — Medication 20 MG: at 12:23

## 2022-02-07 RX ADMIN — SODIUM CHLORIDE, POTASSIUM CHLORIDE, SODIUM LACTATE AND CALCIUM CHLORIDE: 600; 310; 30; 20 INJECTION, SOLUTION INTRAVENOUS at 12:08

## 2022-02-07 RX ADMIN — PHENYLEPHRINE HYDROCHLORIDE 100 MCG: 10 INJECTION INTRAVENOUS at 14:19

## 2022-02-07 RX ADMIN — EPHEDRINE SULFATE 10 MG: 50 INJECTION, SOLUTION INTRAVENOUS at 13:17

## 2022-02-07 RX ADMIN — MIDAZOLAM HYDROCHLORIDE 1 MG: 1 INJECTION, SOLUTION INTRAMUSCULAR; INTRAVENOUS at 11:15

## 2022-02-07 RX ADMIN — FENTANYL CITRATE 25 MCG: 50 INJECTION, SOLUTION INTRAMUSCULAR; INTRAVENOUS at 12:34

## 2022-02-07 ASSESSMENT — MIFFLIN-ST. JEOR: SCORE: 1527.38

## 2022-02-07 NOTE — TELEPHONE ENCOUNTER
Scheduled pt's post-op apt in 3 wks. Called pt and notified pt via VM.        -ORIN Fierro- Orthopedics      ----- Message from Radha Ballard MD sent at 2/7/2022  2:58 PM CST -----  Please schedule post-op follow up with Dr. Zhang or Kelsie Balbuena PA-C in 3 weeks for wound check and staple removal.    Thanks!

## 2022-02-07 NOTE — ANESTHESIA CARE TRANSFER NOTE
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    Anesthesia Type:   No value filed.     Note:    Oropharynx: oropharynx clear of all foreign objects and spontaneously breathing  Level of Consciousness: drowsy  Oxygen Supplementation: face mask  Level of Supplemental Oxygen (L/min / FiO2): 10  Independent Airway: airway patency satisfactory and stable  Dentition: dentition unchanged  Vital Signs Stable: post-procedure vital signs reviewed and stable  Report to RN Given: handoff report given  Patient transferred to: PACU    Handoff Report: Identifed the Patient, Identified the Reponsible Provider, Reviewed the pertinent medical history, Discussed the surgical course, Reviewed Intra-OP anesthesia mangement and issues during anesthesia, Set expectations for post-procedure period and Allowed opportunity for questions and acknowledgement of understanding      Vitals:  Vitals Value Taken Time   /83 02/07/22 1501   Temp     Pulse 105 02/07/22 1505   Resp 13 02/07/22 1505   SpO2 100 % 02/07/22 1505   Vitals shown include unvalidated device data.    Electronically Signed By: DANDRE PEÑA APRN CRNA  February 7, 2022  3:06 PM

## 2022-02-07 NOTE — ANESTHESIA PREPROCEDURE EVALUATION
Anesthesia Pre-Procedure Evaluation    Patient: Abbe Lezama   MRN: [id number] : 1965        Preoperative Diagnosis: Sarcoma (H) [C49.9]    Procedure : Procedure(s):  wide resection left shoulder and supraclavicular masses  APPLICATION, GRAFT, SKIN, SPLIT-THICKNESS from left thigh to left shoulder          History reviewed. No pertinent past medical history.   History reviewed. No pertinent surgical history.   Allergies   Allergen Reactions     Other Drug Allergy (See Comments) Anaphylaxis     Petroleum Gauze - had complete skin breakdown after radiation site was dressed with petroleum gauze.     Albumin, Egg Rash     Raw egg      Social History     Tobacco Use     Smoking status: Never Smoker     Smokeless tobacco: Never Used   Substance Use Topics     Alcohol use: Never      Wt Readings from Last 1 Encounters:   22 101 kg (222 lb 10.6 oz)        Anesthesia Evaluation            ROS/MED HX  ENT/Pulmonary:  - neg pulmonary ROS     Neurologic:  - neg neurologic ROS     Cardiovascular:  - neg cardiovascular ROS     METS/Exercise Tolerance:     Hematologic:  - neg hematologic  ROS     Musculoskeletal:  - neg musculoskeletal ROS     GI/Hepatic:  - neg GI/hepatic ROS     Renal/Genitourinary:  - neg Renal ROS     Endo:     (+) Obesity,     Psychiatric/Substance Use:  - neg psychiatric ROS     Infectious Disease:       Malignancy:  - neg malignancy ROS     Other:  - neg other ROS          Physical Exam    Airway        Mallampati: II   TM distance: > 3 FB   Neck ROM: full   Mouth opening: > 3 cm    Respiratory Devices and Support         Dental  no notable dental history         Cardiovascular   cardiovascular exam normal          Pulmonary   pulmonary exam normal                OUTSIDE LABS:  CBC: No results found for: WBC, HGB, HCT, PLT  BMP:   Lab Results   Component Value Date    GLC 75 2022     COAGS: No results found for: PTT, INR, FIBR  POC: No results found for: BGM, HCG, HCGS  HEPATIC: No  results found for: ALBUMIN, PROTTOTAL, ALT, AST, GGT, ALKPHOS, BILITOTAL, BILIDIRECT, DAISY  OTHER: No results found for: PH, LACT, A1C, JANI, PHOS, MAG, LIPASE, AMYLASE, TSH, T4, T3, CRP, SED    Anesthesia Plan    ASA Status:  2   NPO Status:  NPO Appropriate    Anesthesia Type: General.     - Airway: ETT   Induction: Intravenous.   Maintenance: Balanced.        Consents    Anesthesia Plan(s) and associated risks, benefits, and realistic alternatives discussed. Questions answered and patient/representative(s) expressed understanding.    - Discussed:     - Discussed with:  Patient      - Extended Intubation/Ventilatory Support Discussed: No.      - Patient is DNR/DNI Status: No    Use of blood products discussed: No .     Postoperative Care       PONV prophylaxis: Ondansetron (or other 5HT-3)     Comments:                Gerri Mullins MD

## 2022-02-07 NOTE — DISCHARGE INSTRUCTIONS
Same-Day Surgery   Adult Discharge Orders & Instructions     For 24 hours after surgery:  1. Get plenty of rest.  A responsible adult must stay with you for at least 24 hours after you leave the hospital.   2. Pain medication can slow your reflexes. Do not drive or use heavy equipment.  If you have weakness or tingling, don't drive or use heavy equipment until this feeling goes away.  3. Mixing alcohol and pain medication can cause dizziness and slow your breathing. It can even be fatal. Do not drink alcohol while taking pain medication.  4. Avoid strenuous or risky activities.  Ask for help when climbing stairs.   5. You may feel lightheaded.  If so, sit for a few minutes before standing.  Have someone help you get up.   6. If you have nausea (feel sick to your stomach), drink only clear liquids such as apple juice, ginger ale, broth or 7-Up.  Rest may also help.  Be sure to drink enough fluids.  Move to a regular diet as you feel able. Take pain medications with a small amount of solid food, such as toast or crackers, to avoid nausea.   7. A slight fever is normal. Call the doctor if your fever is over 100 F (37.7 C) (taken under the tongue) or lasts longer than 24 hours.  8. You may have a dry mouth, muscle aches, trouble sleeping or a sore throat.  These symptoms should go away after 24 hours.  9. Do not make important or legal decisions.   Pain Management:      1. Take pain medication (if prescribed) for pain as directed by your physician.        2. WARNING: If the pain medication you have been prescribed contains Tylenol  (acetaminophen), DO NOT take additional doses of Tylenol (acetaminophen).     Call your doctor for any of the followin.  Signs of infection (fever, growing tenderness at the surgery site, severe pain, a large amount of drainage or bleeding, foul-smelling drainage, redness, swelling).    2.  It has been over 8 to 10 hours since surgery and you are still not able to urinate (pee).    3.   Headache for over 24 hours.    4.  Numbness, tingling or weakness the day after surgery (if you had spinal anesthesia).  To contact a doctor, call _____________________________________ or:      [phone] and ask for the Resident On Call for:          __________________________________________ (answered 24 hours a day)      Emergency Department:  [place] [facility]: [phone]  [place] [facility]: [phone]               Rev. 10/2014

## 2022-02-07 NOTE — TELEPHONE ENCOUNTER
RN called over to the [facility] wound care clinic -   [phone].  Spoke with mariusz.  RN  Will fax over referral to them at   Fax: [phone].    At the present, we do not have a OP note, once I get that, we will fax that as well.  Nursing there will review and call patient to schedule or call us denying service.

## 2022-02-07 NOTE — ANESTHESIA PROCEDURE NOTES
Brachial plexus Procedure Note    Pre-Procedure   Staff -        Anesthesiologist:  Jacky Dotson MD       Performed By: anesthesiologist       Location: pre-op       Procedure Start/Stop Times: 2/7/2022 11:12 AM and 2/7/2022 11:20 AM       Pre-Anesthestic Checklist: patient identified, IV checked, site marked, risks and benefits discussed, informed consent, monitors and equipment checked, pre-op evaluation, at physician/surgeon's request and post-op pain management  Timeout:       Correct Patient: Yes        Correct Procedure: Yes        Correct Site: Yes        Correct Position: Yes        Correct Laterality: Yes        Site Marked: Yes  Procedure Documentation  Procedure: Brachial plexus       Laterality: left       Patient Position: sitting       Patient Prep/Sterile Barriers: sterile gloves, mask       Skin prep: Chloraprep (interscalene approach).       Needle Type: short bevel       Needle Gauge: 21.        Needle Length (millimeters): 100        Ultrasound guided       1. Ultrasound was used to identify targeted nerve, plexus, vascular marker, or fascial plane and place a needle adjacent to it in real-time.       2. Ultrasound was used to visualize the spread of anesthetic in close proximity to the above referenced structure.       3. A permanent image is entered into the patient's record.       4. The visualized anatomic structures appeared normal.       5. There were no apparent abnormal pathologic findings.    Assessment/Narrative         The placement was negative for: blood aspirated, painful injection and site bleeding       Paresthesias: No.     Bolus given via needle..        Secured via.        Insertion/Infusion Method: Single Shot       Complications: none       Injection made incrementally with aspirations every 5 mL.

## 2022-02-07 NOTE — PROGRESS NOTES
SPIRITUAL HEALTH SERVICES  [facility] ([facility]) [address] Pre-surg  PRE-SURGERY VISIT    Had pre-surgery visit with pt and .  Provided spiritual support, prayer.   Loi Kelsey M.Div (Bill)., [facility]  Staff   Pager [phone]

## 2022-02-07 NOTE — ANESTHESIA PROCEDURE NOTES
Airway       Patient location during procedure: OR       Procedure Start/Stop Times: 2/7/2022 1:08 PM and 2/7/2022 1:11 PM  Staff -        CRNA: Jean-Claude Thomas APRN CRNA       Performed By: CRNA  Consent for Airway        Urgency: elective  Indications and Patient Condition       Indications for airway management: kathy-procedural       Induction type:intravenous       Mask difficulty assessment: 1 - vent by mask    Final Airway Details       Final airway type: endotracheal airway       Successful airway: ETT - single and Oral  Endotracheal Airway Details        ETT size (mm): 6.5       Cuffed: yes       Successful intubation technique: video laryngoscopy       VL Blade Size: MAC 3       Grade View of Cords: 1       Adjucts: stylet       Position: Right       Measured from: lips       Secured at (cm): 20       Bite block used: None    Post intubation assessment        Placement verified by: capnometry, equal breath sounds and chest rise        Number of attempts at approach: 1       Number of other approaches attempted: 0       Secured with: silk tape       Ease of procedure: easy       Dentition: Intact and Unchanged

## 2022-02-07 NOTE — OP NOTE
DATE OF SURGERY: 2/7/2022    PREOPERATIVE DIAGNOSIS: #1 left shoulder soft tissue sarcoma, #2 left supraclavicular mass    POSTOPERATIVE DIAGNOSIS: #1 left shoulder soft tissue sarcoma, #2 left supraclavicular mass    PROCEDURE: #1 wide resection left shoulder sarcoma, deep, greater than 5 cm, #2 wide resection left supraclavicular mass, subcutaneous, 2 cm    SURGEON: Gamal Zhang MD     ASSISTANT: Renetta Ballard MD    PATIENT HISTORY: This 56-year-old woman has a history of a shoulder sarcoma.  She is developed an additional mass above the shoulder and presents now to have these resected.  The shoulder mass was fungating and has been radiated.  She understands the risks of weakness bleeding infection pain numbness tingling wound dehiscence deep venous thrombosis and pulmonary embolism.    DESCRIPTION OF PROCEDURE: The patient was successful induction of anesthesia.  The left arm and leg were washed and sterilely prepped and draped.  We were prepared to do a split-thickness skin graft.  We began by excising the supraclavicular mass.  After incising the skin over the mass we divided the superficial subcutaneous tissue with cautery and then I went circumferentially around the mass dividing all the subcutaneous tissue.  I did not expose muscle or bone.  The mass was removed with surrounding subcutaneous tissues.  I placed the stitch on the superficial side and sent to pathology in formalin.  We irrigated this area out cauterize some small bleeding vessels and closed using 2-0 PDS in the subcutaneous tissue and Monocryl in the skin.  Next we turned our attention to the arm mass.  We made an incision in the skin around the edge of the mass that we could mobilize.  After incising the skin we divided the subcutaneous tissue with cautery down to the fascia of the deltoid triceps and biceps.  The fascia was elevated off of these structures.  Some of the deltoid muscle was involved and so this had to be  sacrificed.  After the mass was removed I placed a tag stitch on the proximal skin edge and on the posterior lateral skin edge.  Specimen was sent to pathology.  We cauterize numerous small bleeding vessels copiously irrigated and then decided we would try to at least close the hands primarily.  Beginning distally I removed triangular shape of tissue that included skin and subcutaneous tissue and then began to close with 2-0 PDS.  I repeated this proximally and was able to reapproximate skin and close down the wound significantly.  I removed some fatty tissue from the deep deltoid area and then we reassessed the skin tension.  I felt that we could continue to work on closure so we stapled the skin that had been closed and then continue with the 2-0 PDS.  We eventually were able to close the wound entirely.  I then stapled the skin.  The patient was extubated and taken to the recovery room in stable condition.  There were no complications.  The estimated blood loss is 75 mL.  I was present for the entire procedure.    Gamal Zhang MD

## 2022-02-08 ENCOUNTER — TELEPHONE (OUTPATIENT)
Dept: ORTHOPEDICS | Facility: CLINIC | Age: 57
End: 2022-02-08
Payer: COMMERCIAL

## 2022-02-08 NOTE — ANESTHESIA POSTPROCEDURE EVALUATION
Patient: Abbe Lezama    Procedure: Procedure(s):  wide resection left shoulder and supraclavicular masses       Diagnosis:Sarcoma (H) [C49.9]  Diagnosis Additional Information: No value filed.    Anesthesia Type:  No value filed.    Note:  Disposition: Inpatient   Postop Pain Control: Uneventful            Sign Out: Well controlled pain   PONV: No   Neuro/Psych: Uneventful            Sign Out: Acceptable/Baseline neuro status   Airway/Respiratory: Uneventful            Sign Out: Acceptable/Baseline resp. status   CV/Hemodynamics: Uneventful            Sign Out: Acceptable CV status; No obvious hypovolemia; No obvious fluid overload   Other NRE: NONE   DID A NON-ROUTINE EVENT OCCUR? No           Last vitals:  Vitals Value Taken Time   /70 02/07/22 1530   Temp 36.4  C (97.5  F) 02/07/22 1530   Pulse 109 02/07/22 1530   Resp 25 02/07/22 1534   SpO2 100 % 02/07/22 1544   Vitals shown include unvalidated device data.    Electronically Signed By: Bernice Yarbrough MD  February 7, 2022  7:33 PM

## 2022-02-08 NOTE — TELEPHONE ENCOUNTER
[facility] Call Center    Phone Message    May a detailed message be left on voicemail: yes     Reason for Call: Other: Faiza from the [facility] in [place] has questions regarding the wound care referral please call her [phone]     Action Taken: Other: ortho

## 2022-02-08 NOTE — TELEPHONE ENCOUNTER
RN called and spoke with Abbe.  Abbe states that her block wore off at 3 am the morning after surgery.  She said that she took her pain medication and in about 25 minutes felt some relief.  She states that when she took 2 tablets of pain medication she was a little dizzy.  She states that she is taking the Tylenol.  RN says that she should alternate with the Oxycodone.  She is taking the Atarax as well.  Overall this RN feels that she is doing well.  Her pain is tolerable for the day after surgery.  She asked about her incision.  She is curious about what to expect when she takes off the dressing.  RN states that the incision should appear be healing and intact.  There may be steri strips on, they will fall off on their own in 10-12 days.  You may place a dressing over the incision.  She was concerned that a band aid was going to be to small.  She also was asking about the bruising to her right arm.  She believes that they may have tried to start another line.  She states that she bruises easily and may have been dehydrated.  We spoke about bowel movements.  She is taking medication.  She states that she had some diarrhea day before surgery, due to probably some anxiety.  She does have bowel sounds and passing some gas.  She is encourage to continue on the stool softners and the oft her mediation that she is taking for this.  She was instructed that pain medication will cause constipation, so as long as she is taking this medication and hydrated.  She should be ok.  RN also discussed at the asking of the patient.  When should she be concerned about not having a BM.  She states that she has several BM's a day.  RN states that since she had episodes of diarrhea prior to surgery and not really eating like normal after surgery.  That her BM's may not be as many for a few days, until she returns to normal eating habit.    RN also suggested that she be icing, to help with the pain and edema.  Her spouse Jeff was also in  the background taking part of the conversation.  All of her questions were answered.  She will come back and see us in  3 weeks for wound check and to talk about the plan going forward.  She is aware that she will need to have an MRi in the future.  She states that [facility] has an open one where you can sit down and do your imaging.  RN states that we will discuss that at the visit in 3 weeks.  She is satisfied that all of her questions were answered and has no others at this time.

## 2022-02-08 NOTE — TELEPHONE ENCOUNTER
[facility] Call Center    Phone Message    May a detailed message be left on voicemail: yes     Reason for Call: Other: Patient is requesting a call back from Dr. Zhang's care team to discuss her pain management?     She is 1 day post-op and her oxy is supposed to last about 4 hours and it's wearing off in 2 hours instead.    Action Taken: Message routed to:  [facility] ortho    Travel Screening: Not Applicable

## 2022-02-09 ENCOUNTER — MYC MEDICAL ADVICE (OUTPATIENT)
Dept: ORTHOPEDICS | Facility: CLINIC | Age: 57
End: 2022-02-09
Payer: COMMERCIAL

## 2022-02-09 NOTE — TELEPHONE ENCOUNTER
Pt's pain more controlled now, going at least 6 hours now between pain meds.  Patient doing ok.  All questions answered.

## 2022-02-10 LAB
PATH REPORT.COMMENTS IMP SPEC: NORMAL
PATH REPORT.FINAL DX SPEC: NORMAL
PATH REPORT.GROSS SPEC: NORMAL
PATH REPORT.MICROSCOPIC SPEC OTHER STN: NORMAL
PATHOLOGY SYNOPTIC REPORT: NORMAL
PHOTO IMAGE: NORMAL

## 2022-02-10 PROCEDURE — 88309 TISSUE EXAM BY PATHOLOGIST: CPT | Mod: 26 | Performed by: PATHOLOGY

## 2022-02-10 PROCEDURE — 88307 TISSUE EXAM BY PATHOLOGIST: CPT | Mod: 26 | Performed by: PATHOLOGY

## 2022-03-02 ENCOUNTER — OFFICE VISIT (OUTPATIENT)
Dept: ORTHOPEDICS | Facility: CLINIC | Age: 57
End: 2022-03-02
Payer: COMMERCIAL

## 2022-03-02 DIAGNOSIS — C49.9 SARCOMA (H): Primary | ICD-10-CM

## 2022-03-02 PROCEDURE — 99024 POSTOP FOLLOW-UP VISIT: CPT | Performed by: ORTHOPAEDIC SURGERY

## 2022-03-02 NOTE — PROGRESS NOTES
This 56-year-old is about 3 weeks out from wide resection of a left arm soft tissue sarcoma.  She had a primary closure.  She also had a supraclavicular mass excised which was a regional metastasis possibly in a lymph node.  She has had more pain in the supraclavicular area than humerus.  She has some pain in the medial aspect of the arm.  She still has some desquamation of the posterior upper arm.  She has noticed that the skin in the axillary area is thickened and she does not have a normal perspiration that she is accustomed to.    On examination she is alert oriented has normal mood and affect and is in no acute distress.  In the left shoulder area the incision is intact with no erythema or drainage.  She has some mild swelling throughout the arm down even to the hand.  The supraclavicular incision is well-healed.    I remove the staples without incident and cover the incision with the bandage.    I have recommended repeating her PET scan.  She last had 1 5 months ago.  This was a consensus from her group given her regional metastasis that could be in a lymph node.  She is hesitant to do this but is willing to do it next month.  I thought this was okay.  We will arrange the PET scan to be done where she had a previous one close to home.  We will then contact her with the results and any further recommendations.  I have answered all of her questions today.

## 2022-03-02 NOTE — NURSING NOTE
Reason For Visit:   Chief Complaint   Patient presents with     Surgical Followup     3 wk post-op wide resection left shoulder and supraclavicular masses DOS 2/7/22 // wound check        There were no vitals taken for this visit.    Pain Assessment  Patient Currently in Pain: Denies (no pain per pt)      Vadim Willingham ATC

## 2022-03-02 NOTE — LETTER
3/2/2022         RE: Abbe Lezama  [address] [address]  [place] [address]        Dear Colleague,    Thank you for referring your patient, Abbe Lezama, to the [facility] [place]. Please see a copy of my visit note below.    This 56-year-old is about 3 weeks out from wide resection of a left arm soft tissue sarcoma.  She had a primary closure.  She also had a supraclavicular mass excised which was a regional metastasis possibly in a lymph node.  She has had more pain in the supraclavicular area than humerus.  She has some pain in the medial aspect of the arm.  She still has some desquamation of the posterior upper arm.  She has noticed that the skin in the axillary area is thickened and she does not have a normal perspiration that she is accustomed to.    On examination she is alert oriented has normal mood and affect and is in no acute distress.  In the left shoulder area the incision is intact with no erythema or drainage.  She has some mild swelling throughout the arm down even to the hand.  The supraclavicular incision is well-healed.    I remove the staples without incident and cover the incision with the bandage.    I have recommended repeating her PET scan.  She last had 1 5 months ago.  This was a consensus from her group given her regional metastasis that could be in a lymph node.  She is hesitant to do this but is willing to do it next month.  I thought this was okay.  We will arrange the PET scan to be done where she had a previous one close to home.  We will then contact her with the results and any further recommendations.  I have answered all of her questions today.    Sincerely,        Gamal Zhang MD

## 2022-03-04 ENCOUNTER — TELEPHONE (OUTPATIENT)
Dept: ORTHOPEDICS | Facility: CLINIC | Age: 57
End: 2022-03-04
Payer: COMMERCIAL

## 2022-03-04 NOTE — TELEPHONE ENCOUNTER
RN called to:    [facility]  Address: [address] [place] [address]  Phone: [phone]    Fax:  [phone]

## 2022-05-25 ENCOUNTER — TELEPHONE (OUTPATIENT)
Dept: ORTHOPEDICS | Facility: CLINIC | Age: 57
End: 2022-05-25
Payer: COMMERCIAL

## 2022-05-25 NOTE — TELEPHONE ENCOUNTER
Received phone call from Dr. Rivera regarding this patients PET Scan. Dr. Rivera states that this scan was completed on May 9th and the patient has called her to reach out to Dr. Zhang personally to review the results. Dr. Rivera states she has paged Dr. Zhang multiple times but hasn't heard anything back and would like a return call to discuss this patients scan. She states that the PET scan was not clean and that this should be considered high priority. The writer gathered Dr. Rivera's personal cell number to have Dr. Zhang return her call ASAP.     REI Hernández on 5/25/2022 at 8:20 AM

## 2022-05-26 NOTE — TELEPHONE ENCOUNTER
Dr. Zhang called the patient on 5/25.            -Vadim [facility]- Orthopedics

## 2022-10-03 ENCOUNTER — HEALTH MAINTENANCE LETTER (OUTPATIENT)
Age: 57
End: 2022-10-03

## 2023-02-11 ENCOUNTER — HEALTH MAINTENANCE LETTER (OUTPATIENT)
Age: 58
End: 2023-02-11

## 2023-12-30 ENCOUNTER — HEALTH MAINTENANCE LETTER (OUTPATIENT)
Age: 58
End: 2023-12-30

## 2024-03-09 ENCOUNTER — HEALTH MAINTENANCE LETTER (OUTPATIENT)
Age: 59
End: 2024-03-09

## (undated) DEVICE — SU SILK 2-0 TIE 12X30" A305H

## (undated) DEVICE — SU PDS II 2-0 SH 27" Z317H

## (undated) DEVICE — BLADE DERMATOME ZIMMER  00-8800-000-10

## (undated) DEVICE — DERMACARRIER 1.5:1 ZIMMER 00-2195-012-00

## (undated) DEVICE — STRAP KNEE/BODY 31143004

## (undated) DEVICE — DRAPE CONVERTORS U-DRAPE 60X72" 8476

## (undated) DEVICE — DRSG ADAPTIC 3X8" 6113

## (undated) DEVICE — PREP SKIN SCRUB TRAY 4461A

## (undated) DEVICE — STPL SKIN 35W ROTATING HEAD PRW35

## (undated) DEVICE — Device

## (undated) DEVICE — SLING ARM LG 79-99157

## (undated) DEVICE — SOL NACL 0.9% IRRIG 1000ML BOTTLE 2F7124

## (undated) DEVICE — DRSG STERI STRIP 1/4X3" R1541

## (undated) DEVICE — SU MONOCRYL 4-0 PS-2 18" UND Y496G

## (undated) DEVICE — SU VICRYL 2-0 SH 27" UND J417H

## (undated) DEVICE — PREP POVIDONE-IODINE 7.5% SCRUB 4OZ BOTTLE MDS093945

## (undated) DEVICE — SOL WATER IRRIG 1000ML BOTTLE 2F7114

## (undated) DEVICE — ESU GROUND PAD UNIVERSAL W/O CORD

## (undated) DEVICE — SPONGE LAP 18X18" X8435

## (undated) DEVICE — GOWN XLG DISP 9545

## (undated) DEVICE — PACK LOWER EXTREMITY RIVERSIDE SOP32LEFSX

## (undated) DEVICE — DRSG STERI STRIP 1/2X4" R1547

## (undated) DEVICE — ESU PENCIL W/SMOKE EVAC NEPTUNE STRYKER 0703-046-000

## (undated) DEVICE — GLOVE PROTEXIS W/NEU-THERA 7.0  2D73TE70

## (undated) DEVICE — SPONGE RAY-TEC 4X8" 7318

## (undated) DEVICE — LINEN TOWEL PACK X5 5464

## (undated) DEVICE — PREP DURAPREP 26ML APL 8630

## (undated) DEVICE — PACK UNIVERSAL SPLIT 29131

## (undated) DEVICE — DRSG PRIMAPORE 02X3" 7133

## (undated) DEVICE — LINEN ORTHO PACK 5446

## (undated) DEVICE — GLOVE PROTEXIS BLUE W/NEU-THERA 7.5  2D73EB75

## (undated) DEVICE — DRAPE STERI TOWEL LG 1010

## (undated) DEVICE — LINEN GOWN X4 5410

## (undated) DEVICE — SU SILK 2-0 SH 30" K833H

## (undated) RX ORDER — LIDOCAINE HYDROCHLORIDE 20 MG/ML
INJECTION, SOLUTION EPIDURAL; INFILTRATION; INTRACAUDAL; PERINEURAL
Status: DISPENSED
Start: 2022-02-07

## (undated) RX ORDER — FENTANYL CITRATE-0.9 % NACL/PF 10 MCG/ML
PLASTIC BAG, INJECTION (ML) INTRAVENOUS
Status: DISPENSED
Start: 2022-02-07

## (undated) RX ORDER — ACETAMINOPHEN 325 MG/1
TABLET ORAL
Status: DISPENSED
Start: 2022-02-07

## (undated) RX ORDER — CEFAZOLIN SODIUM 2 G/100ML
INJECTION, SOLUTION INTRAVENOUS
Status: DISPENSED
Start: 2022-02-07

## (undated) RX ORDER — OXYCODONE HYDROCHLORIDE 5 MG/1
TABLET ORAL
Status: DISPENSED
Start: 2022-02-07

## (undated) RX ORDER — FENTANYL CITRATE 50 UG/ML
INJECTION, SOLUTION INTRAMUSCULAR; INTRAVENOUS
Status: DISPENSED
Start: 2022-02-07

## (undated) RX ORDER — ONDANSETRON 2 MG/ML
INJECTION INTRAMUSCULAR; INTRAVENOUS
Status: DISPENSED
Start: 2022-02-07

## (undated) RX ORDER — BUPIVACAINE HYDROCHLORIDE AND EPINEPHRINE 2.5; 5 MG/ML; UG/ML
INJECTION, SOLUTION EPIDURAL; INFILTRATION; INTRACAUDAL; PERINEURAL
Status: DISPENSED
Start: 2022-02-07

## (undated) RX ORDER — EPHEDRINE SULFATE 50 MG/ML
INJECTION, SOLUTION INTRAMUSCULAR; INTRAVENOUS; SUBCUTANEOUS
Status: DISPENSED
Start: 2022-02-07

## (undated) RX ORDER — PROPOFOL 10 MG/ML
INJECTION, EMULSION INTRAVENOUS
Status: DISPENSED
Start: 2022-02-07